# Patient Record
Sex: MALE | Race: BLACK OR AFRICAN AMERICAN | HISPANIC OR LATINO
[De-identification: names, ages, dates, MRNs, and addresses within clinical notes are randomized per-mention and may not be internally consistent; named-entity substitution may affect disease eponyms.]

---

## 2024-06-28 ENCOUNTER — DASHBOARD ENCOUNTERS (OUTPATIENT)
Age: 59
End: 2024-06-28

## 2024-07-02 ENCOUNTER — OFFICE VISIT (OUTPATIENT)
Dept: URBAN - METROPOLITAN AREA CLINIC 92 | Facility: CLINIC | Age: 59
End: 2024-07-02

## 2024-07-02 RX ORDER — EMPAGLIFLOZIN 25 MG/1
TAKE 1 TABLET (25 MG) BY ORAL ROUTE ONCE DAILY IN THE MORNING TABLET, FILM COATED ORAL 1
Qty: 0 | Refills: 0 | Status: ACTIVE | COMMUNITY
Start: 1900-01-01

## 2024-07-02 NOTE — HPI-TODAY'S VISIT:
58-year-old male patient presents today due to abdominal pain.  His last colonoscopy was  this demonstrated medium sized internal hemorrhoids, diminutive tubular adenoma

## 2024-08-01 ENCOUNTER — OFFICE VISIT (OUTPATIENT)
Dept: URBAN - METROPOLITAN AREA CLINIC 92 | Facility: CLINIC | Age: 59
End: 2024-08-01

## 2024-08-01 RX ORDER — EMPAGLIFLOZIN 25 MG/1
TAKE 1 TABLET (25 MG) BY ORAL ROUTE ONCE DAILY IN THE MORNING TABLET, FILM COATED ORAL 1
Qty: 0 | Refills: 0 | Status: ACTIVE | COMMUNITY
Start: 1900-01-01

## 2024-08-22 ENCOUNTER — OFFICE VISIT (OUTPATIENT)
Dept: URBAN - METROPOLITAN AREA CLINIC 92 | Facility: CLINIC | Age: 59
End: 2024-08-22
Payer: COMMERCIAL

## 2024-08-22 VITALS
TEMPERATURE: 97 F | HEIGHT: 67 IN | DIASTOLIC BLOOD PRESSURE: 74 MMHG | HEART RATE: 72 BPM | WEIGHT: 150.4 LBS | SYSTOLIC BLOOD PRESSURE: 127 MMHG | BODY MASS INDEX: 23.61 KG/M2

## 2024-08-22 DIAGNOSIS — K59.04 CHRONIC IDIOPATHIC CONSTIPATION: ICD-10-CM

## 2024-08-22 DIAGNOSIS — Z86.010 PERSONAL HISTORY OF COLONIC POLYPS: ICD-10-CM

## 2024-08-22 DIAGNOSIS — R15.1 FECAL SMEARING: ICD-10-CM

## 2024-08-22 PROBLEM — 428283002: Status: ACTIVE | Noted: 2024-08-22

## 2024-08-22 PROBLEM — 82934008: Status: ACTIVE | Noted: 2024-08-22

## 2024-08-22 PROCEDURE — 99203 OFFICE O/P NEW LOW 30 MIN: CPT

## 2024-08-22 NOTE — HPI-TODAY'S VISIT:
58-year-old male patient presents today due to colonoscopy consult. The pt has developmental delay and is alone today. He is able to communicate and provide a history.  His last colonoscopy was  this demonstrated medium-sized internal hemorrhoids, diminutive tubular adenoma.  Has small BM and goes daily. He has been having fecal smearing on his underwear. No BRBPR or melena. Back in August he had his teeth extracted. It has been hard for him to swallow foods so lost weight. Lost around 5-10 pounds.  No abd pain, n/v/f/c, gerd sx.  He has DM on insulin. He has h/o Seizure on topiramate. Last seizure was in april was admitted to [facility]- this was a generalized tonic-clonic seizure. He has not seen neurology but follow with his PCP for this. Dr. Talya Maciel. No Pacemaker/defib, o2 use, ckd, blood thinner use.

## 2024-09-27 ENCOUNTER — OFFICE VISIT (OUTPATIENT)
Dept: URBAN - METROPOLITAN AREA SURGERY CENTER 16 | Facility: SURGERY CENTER | Age: 59
End: 2024-09-27

## 2024-10-18 ENCOUNTER — OFFICE VISIT (OUTPATIENT)
Dept: URBAN - METROPOLITAN AREA CLINIC 92 | Facility: CLINIC | Age: 59
End: 2024-10-18